# Patient Record
Sex: FEMALE | Race: WHITE | Employment: OTHER | URBAN - METROPOLITAN AREA
[De-identification: names, ages, dates, MRNs, and addresses within clinical notes are randomized per-mention and may not be internally consistent; named-entity substitution may affect disease eponyms.]

---

## 2017-01-31 ENCOUNTER — OFFICE VISIT (OUTPATIENT)
Dept: INTERNAL MEDICINE CLINIC | Facility: CLINIC | Age: 82
End: 2017-01-31

## 2017-01-31 VITALS
WEIGHT: 165 LBS | SYSTOLIC BLOOD PRESSURE: 110 MMHG | TEMPERATURE: 97 F | BODY MASS INDEX: 29.23 KG/M2 | HEART RATE: 64 BPM | HEIGHT: 63 IN | DIASTOLIC BLOOD PRESSURE: 80 MMHG

## 2017-01-31 DIAGNOSIS — I10 ESSENTIAL HYPERTENSION, BENIGN: Primary | ICD-10-CM

## 2017-01-31 DIAGNOSIS — E78.00 PURE HYPERCHOLESTEROLEMIA: ICD-10-CM

## 2017-01-31 LAB
ALBUMIN SERPL BCP-MCNC: 3.4 G/DL (ref 3.5–4.8)
ALBUMIN/GLOB SERPL: 1.1 {RATIO} (ref 1–2)
ALP SERPL-CCNC: 45 U/L (ref 32–100)
ALT SERPL-CCNC: 18 U/L (ref 14–54)
ANION GAP SERPL CALC-SCNC: 7 MMOL/L (ref 0–18)
AST SERPL-CCNC: 16 U/L (ref 15–41)
BASOPHILS # BLD: 0 K/UL (ref 0–0.2)
BASOPHILS NFR BLD: 0 %
BILIRUB SERPL-MCNC: 0.6 MG/DL (ref 0.3–1.2)
BUN SERPL-MCNC: 26 MG/DL (ref 8–20)
BUN/CREAT SERPL: 21 (ref 10–20)
CALCIUM SERPL-MCNC: 9.4 MG/DL (ref 8.5–10.5)
CHLORIDE SERPL-SCNC: 103 MMOL/L (ref 95–110)
CHOLEST SERPL-MCNC: 195 MG/DL (ref 110–200)
CO2 SERPL-SCNC: 25 MMOL/L (ref 22–32)
CREAT SERPL-MCNC: 1.24 MG/DL (ref 0.5–1.5)
EOSINOPHIL # BLD: 0.1 K/UL (ref 0–0.7)
EOSINOPHIL NFR BLD: 2 %
ERYTHROCYTE [DISTWIDTH] IN BLOOD BY AUTOMATED COUNT: 14 % (ref 11–15)
GLOBULIN PLAS-MCNC: 3 G/DL (ref 2.5–3.7)
GLUCOSE SERPL-MCNC: 97 MG/DL (ref 70–99)
HCT VFR BLD AUTO: 36 % (ref 35–48)
HDLC SERPL-MCNC: 43 MG/DL
HGB BLD-MCNC: 12.1 G/DL (ref 12–16)
LDLC SERPL CALC-MCNC: 132 MG/DL (ref 0–99)
LYMPHOCYTES # BLD: 1.1 K/UL (ref 1–4)
LYMPHOCYTES NFR BLD: 24 %
MCH RBC QN AUTO: 31.1 PG (ref 27–32)
MCHC RBC AUTO-ENTMCNC: 33.8 G/DL (ref 32–37)
MCV RBC AUTO: 92.1 FL (ref 80–100)
MONOCYTES # BLD: 0.4 K/UL (ref 0–1)
MONOCYTES NFR BLD: 10 %
NEUTROPHILS # BLD AUTO: 2.9 K/UL (ref 1.8–7.7)
NEUTROPHILS NFR BLD: 64 %
NONHDLC SERPL-MCNC: 152 MG/DL
OSMOLALITY UR CALC.SUM OF ELEC: 285 MOSM/KG (ref 275–295)
PLATELET # BLD AUTO: 227 K/UL (ref 140–400)
PMV BLD AUTO: 9.3 FL (ref 7.4–10.3)
POTASSIUM SERPL-SCNC: 4.5 MMOL/L (ref 3.3–5.1)
PROT SERPL-MCNC: 6.4 G/DL (ref 5.9–8.4)
RBC # BLD AUTO: 3.9 M/UL (ref 3.7–5.4)
SODIUM SERPL-SCNC: 135 MMOL/L (ref 136–144)
TRIGL SERPL-MCNC: 98 MG/DL (ref 1–149)
WBC # BLD AUTO: 4.5 K/UL (ref 4–11)

## 2017-01-31 PROCEDURE — 36415 COLL VENOUS BLD VENIPUNCTURE: CPT | Performed by: INTERNAL MEDICINE

## 2017-01-31 PROCEDURE — 99214 OFFICE O/P EST MOD 30 MIN: CPT | Performed by: INTERNAL MEDICINE

## 2017-01-31 NOTE — PROGRESS NOTES
HPI:    Patient ID: Unruly Gray is a 80year old female. Hypertension  This is a chronic problem. The current episode started more than 1 year ago. The problem is unchanged. The problem is controlled.  Pertinent negatives include no anxiety, blurred v abdominal distention. Endocrine: Negative for cold intolerance and heat intolerance. Genitourinary: Negative for dysuria, urgency, frequency, hematuria, flank pain, difficulty urinating and genital sores.    Musculoskeletal: Positive for arthralgias and Wheelchair with two family members help. HENT:   Mouth/Throat: Oropharynx is clear and moist.   Eyes: EOM are normal. Pupils are equal, round, and reactive to light. Neck: Normal range of motion. No thyromegaly present.    Cardiovascular: Normal rate

## 2017-01-31 NOTE — ASSESSMENT & PLAN NOTE
Patient not interested in going for a CT scan and neurosurgery appointment, difficult to get out of the house.

## 2017-02-16 ENCOUNTER — TELEPHONE (OUTPATIENT)
Dept: INTERNAL MEDICINE CLINIC | Facility: CLINIC | Age: 82
End: 2017-02-16

## 2017-02-16 RX ORDER — LEVOFLOXACIN 250 MG/1
250 TABLET ORAL DAILY
Qty: 5 TABLET | Refills: 0 | Status: SHIPPED | OUTPATIENT
Start: 2017-02-16

## 2017-02-16 NOTE — TELEPHONE ENCOUNTER
Shakira Loyd--daughter  Re: Zan Winston    Has a UTI, frequent urination, has a fowl odor, would like antibiotics sent to pharmacy.     [facility]

## 2017-02-25 RX ORDER — SIMVASTATIN 40 MG
TABLET ORAL
Qty: 30 TABLET | Refills: 11 | Status: SHIPPED | OUTPATIENT
Start: 2017-02-25

## 2017-05-08 RX ORDER — METOPROLOL SUCCINATE 100 MG/1
TABLET, EXTENDED RELEASE ORAL
Qty: 30 TABLET | Refills: 0 | Status: SHIPPED | OUTPATIENT
Start: 2017-05-08 | End: 2017-06-10

## 2017-06-01 RX ORDER — LISINOPRIL 10 MG/1
TABLET ORAL
Qty: 30 TABLET | Refills: 11 | Status: SHIPPED | OUTPATIENT
Start: 2017-06-01 | End: 2018-06-16

## 2017-06-12 RX ORDER — METOPROLOL SUCCINATE 100 MG/1
TABLET, EXTENDED RELEASE ORAL
Qty: 30 TABLET | Refills: 11 | Status: SHIPPED | OUTPATIENT
Start: 2017-06-12 | End: 2018-05-24

## 2017-07-11 ENCOUNTER — APPOINTMENT (OUTPATIENT)
Dept: CT IMAGING | Facility: HOSPITAL | Age: 82
End: 2017-07-11
Attending: EMERGENCY MEDICINE
Payer: MEDICARE

## 2017-07-11 ENCOUNTER — HOSPITAL ENCOUNTER (EMERGENCY)
Facility: HOSPITAL | Age: 82
Discharge: HOME | End: 2017-07-11
Attending: EMERGENCY MEDICINE
Payer: MEDICARE

## 2017-07-11 VITALS
SYSTOLIC BLOOD PRESSURE: 123 MMHG | WEIGHT: 175 LBS | BODY MASS INDEX: 31 KG/M2 | OXYGEN SATURATION: 96 % | RESPIRATION RATE: 18 BRPM | DIASTOLIC BLOOD PRESSURE: 55 MMHG | HEART RATE: 77 BPM | TEMPERATURE: 98 F

## 2017-07-11 DIAGNOSIS — S00.03XA SCALP CONTUSION: Primary | ICD-10-CM

## 2017-07-11 PROCEDURE — 99284 EMERGENCY DEPT VISIT MOD MDM: CPT

## 2017-07-11 PROCEDURE — 72125 CT NECK SPINE W/O DYE: CPT | Performed by: EMERGENCY MEDICINE

## 2017-07-11 PROCEDURE — 70450 CT HEAD/BRAIN W/O DYE: CPT | Performed by: EMERGENCY MEDICINE

## 2017-07-12 NOTE — ED PROVIDER NOTES
Patient Seen in: [facility] Emergency Department    History   Patient presents with:  Head Injury  Fall    Stated Complaint:     HPI    The patient is a 60-year-old female with past history of hypertension, hyperlipidemia, venous stasis ulcers who eye.   Multiple Vitamins-Minerals (OCUVITE ADULT 50+) Oral Cap,  Take  by mouth. Pimecrolimus (ELIDEL) 1 % Apply Externally Cream,  Apply  topically.    Timolol Maleate (TIMOPTIC-XR) 0.5 % Ophthalmic Gel Forming Solution,     silver sulfADIAZINE (MICH warmth to the touch      ED Course   Labs Reviewed - No data to display    ============================================================  ED Course  ------------------------------------------------------------  MDM     Differential diagnosis includes scalp

## 2017-07-12 NOTE — ED INITIAL ASSESSMENT (HPI)
Pt reports she tripped and fell onto ceramic tile. Pt found by caregiver. Pt unsure how long she was on the ground. Per family pts son left around noon and found by caregiver at 200.  Pt reports she doesn't believe she was on the ground for long reporting

## 2017-07-12 NOTE — ED NOTES
Pt continues to deny complaints, pt and family given d/c and f/u instructions. Pt verbalized understanding. Pt d/c home with family via TERESA Montague.

## 2017-07-21 ENCOUNTER — TELEPHONE (OUTPATIENT)
Dept: INTERNAL MEDICINE CLINIC | Facility: CLINIC | Age: 82
End: 2017-07-21

## 2017-07-21 NOTE — TELEPHONE ENCOUNTER
Cristal [facility]----[phone]    Re: Jaskaran Chris a few weeks ago, was taken to [facility], she is very concerned that she can hardly walk, had a CT Scan, also wants to know if she should follow up with a neurologist ???

## 2017-10-16 RX ORDER — TRIAMTERENE AND HYDROCHLOROTHIAZIDE 37.5; 25 MG/1; MG/1
TABLET ORAL
Qty: 90 TABLET | Refills: 0 | Status: SHIPPED | OUTPATIENT
Start: 2017-10-16 | End: 2018-01-20

## 2017-11-27 ENCOUNTER — TELEPHONE (OUTPATIENT)
Dept: INTERNAL MEDICINE CLINIC | Facility: CLINIC | Age: 82
End: 2017-11-27

## 2017-11-27 NOTE — TELEPHONE ENCOUNTER
Cyndi Hein    RE: Ashley     Right Knee has been swollen for awhile now, doesn't know if their is fluid,or  Possibly broken, daughter wants to know if she should take her to the  Emergency room ? ?

## 2017-11-28 ENCOUNTER — APPOINTMENT (OUTPATIENT)
Dept: GENERAL RADIOLOGY | Facility: HOSPITAL | Age: 82
End: 2017-11-28
Attending: EMERGENCY MEDICINE
Payer: MEDICARE

## 2017-11-28 ENCOUNTER — HOSPITAL ENCOUNTER (EMERGENCY)
Facility: HOSPITAL | Age: 82
Discharge: HOME | End: 2017-11-28
Attending: EMERGENCY MEDICINE
Payer: MEDICARE

## 2017-11-28 VITALS
RESPIRATION RATE: 16 BRPM | SYSTOLIC BLOOD PRESSURE: 150 MMHG | BODY MASS INDEX: 29.88 KG/M2 | OXYGEN SATURATION: 98 % | HEIGHT: 64 IN | HEART RATE: 60 BPM | TEMPERATURE: 98 F | WEIGHT: 175 LBS | DIASTOLIC BLOOD PRESSURE: 60 MMHG

## 2017-11-28 DIAGNOSIS — S82.891A CLOSED FRACTURE OF RIGHT ANKLE, INITIAL ENCOUNTER: Primary | ICD-10-CM

## 2017-11-28 PROCEDURE — 73560 X-RAY EXAM OF KNEE 1 OR 2: CPT | Performed by: EMERGENCY MEDICINE

## 2017-11-28 PROCEDURE — 99284 EMERGENCY DEPT VISIT MOD MDM: CPT

## 2017-11-28 PROCEDURE — 73610 X-RAY EXAM OF ANKLE: CPT | Performed by: EMERGENCY MEDICINE

## 2017-11-28 NOTE — CM/SW NOTE
Met with patient, son, and daughter for dcp questions. Patient is to be non wt bearing and to f/u with orthopedic however, per son he will make appt with ortho much closer than given here. Daughter and son see their mother daily and do the cooking.   Estela

## 2017-11-28 NOTE — H&P
[facility]    PATIENT'S NAME: Issac Alberto   ATTENDING PHYSICIAN: Etelvina Leon.  Kody Huynh MD   PATIENT ACCOUNT#:   [id number]    LOCATION:  [address]. [facility]  MEDICAL RECORD #:   [id number]       YOB: 1926  ADMISSION DATE:       11/28/

## 2017-11-28 NOTE — ED PROVIDER NOTES
Patient Seen in: [facility] Emergency Department    History   No chief complaint on file.     Stated Complaint: R Leg Pain, Swelling    HPI     HPI: Maria Luz Sher is a 80year old female who presents after an injury to the right ankle/foot 5 days Vitamins-Minerals ([address] 50+) Oral Cap,  Take  by mouth. Pimecrolimus (ELIDEL) 1 % Apply Externally Cream,  Apply  topically.    Timolol Maleate (TIMOPTIC-XR) 0.5 % Ophthalmic Gel Forming Solution,     silver sulfADIAZINE (SILVADENE) 1 % Apply Ext or erythema  NEURO:Sensation to touch is intact. Motor function intact -limited due to pain  SKIN: No open wounds, no rashes. PSYCH: Normal affect. Calm and cooperative.     ED Course   Labs Reviewed - No data to display    Medications - No data to display compartment. 2. No acute findings. Procedures: Splint Placement: The medical technician placed the patient's right ankle in a Splint Type:  Short leg posterior mold.  The splinted extremity was checked after placement and was no with Specialist as instructed. I personally discussed the results of the above ED workup and the associated acute management issues with the patient and family, and I explained the need for further follow-up evaluation and treatment.     Risk:  Patient is a

## 2017-11-28 NOTE — ED NOTES
Pt t ed from home with c/o twisted rt ankle and fall on Friday last week 5 days ago   Pt skin is ecchymosis and painful to touch   Pt had xray

## 2017-12-14 ENCOUNTER — TELEPHONE (OUTPATIENT)
Dept: INTERNAL MEDICINE CLINIC | Facility: CLINIC | Age: 82
End: 2017-12-14

## 2017-12-14 DIAGNOSIS — S82.891A CLOSED FRACTURE OF RIGHT ANKLE, INITIAL ENCOUNTER: Primary | ICD-10-CM

## 2017-12-14 NOTE — TELEPHONE ENCOUNTER
Abebe----daughter-in-law--  [phone]    Re: Johana Payson    Patient fell and broke ankle,daughter-in-law is asking if it is possible to have [address] come a couple times a week to check her.

## 2017-12-14 NOTE — TELEPHONE ENCOUNTER
I will order home health , but I need to know which ankle. I will order nursing, physical therapy , occupational therapy and .

## 2017-12-15 NOTE — TELEPHONE ENCOUNTER
Zulema Dockery-----son----[phone]    Re: Ashley Arellano    Orders for Home Health, Physical Therapy and       DIAG: BROKEN RIGHT ANKLE

## 2018-01-04 ENCOUNTER — TELEPHONE (OUTPATIENT)
Dept: INTERNAL MEDICINE CLINIC | Facility: CLINIC | Age: 83
End: 2018-01-04

## 2018-01-04 NOTE — TELEPHONE ENCOUNTER
Duane----[facility]------[phone]    Re: Smith Claude    Has a wound on her ankle, needs approval for Vaseline Gauze and Foam Dressing---for 3 times weekly, please call

## 2018-01-05 NOTE — TELEPHONE ENCOUNTER
Spoke with Betty, verbal ok given for Vaseline gauze and foam dressing for 3 times a week, she understood

## 2018-01-09 ENCOUNTER — TELEPHONE (OUTPATIENT)
Dept: INTERNAL MEDICINE CLINIC | Facility: CLINIC | Age: 83
End: 2018-01-09

## 2018-01-09 NOTE — TELEPHONE ENCOUNTER
Kasia from [facility] [place] [address]    Needs a Verbal Order:  Would like to continue four more weeks of Home Health.   Needs Wound Education

## 2018-01-22 RX ORDER — TRIAMTERENE AND HYDROCHLOROTHIAZIDE 37.5; 25 MG/1; MG/1
TABLET ORAL
Qty: 90 TABLET | Refills: 3 | Status: SHIPPED | OUTPATIENT
Start: 2018-01-22 | End: 2019-01-19

## 2018-05-24 RX ORDER — METOPROLOL SUCCINATE 100 MG/1
TABLET, EXTENDED RELEASE ORAL
Qty: 30 TABLET | Refills: 10 | Status: SHIPPED | OUTPATIENT
Start: 2018-05-24

## 2018-06-16 RX ORDER — LISINOPRIL 10 MG/1
TABLET ORAL
Qty: 30 TABLET | Refills: 10 | Status: SHIPPED | OUTPATIENT
Start: 2018-06-16

## 2018-10-11 ENCOUNTER — OFFICE VISIT (OUTPATIENT)
Dept: FAMILY MEDICINE CLINIC | Facility: CLINIC | Age: 83
End: 2018-10-11
Payer: MEDICARE

## 2018-10-11 VITALS
SYSTOLIC BLOOD PRESSURE: 112 MMHG | OXYGEN SATURATION: 98 % | WEIGHT: 175 LBS | BODY MASS INDEX: 30 KG/M2 | DIASTOLIC BLOOD PRESSURE: 67 MMHG | RESPIRATION RATE: 16 BRPM | TEMPERATURE: 98 F | HEART RATE: 69 BPM

## 2018-10-11 DIAGNOSIS — B86 SCABIES: Primary | ICD-10-CM

## 2018-10-11 DIAGNOSIS — B02.9 HERPES ZOSTER WITHOUT COMPLICATION: ICD-10-CM

## 2018-10-11 PROCEDURE — 99203 OFFICE O/P NEW LOW 30 MIN: CPT | Performed by: NURSE PRACTITIONER

## 2018-10-11 RX ORDER — PERMETHRIN 50 MG/G
CREAM TOPICAL
Qty: 60 G | Refills: 0 | Status: SHIPPED | OUTPATIENT
Start: 2018-10-11

## 2018-10-11 RX ORDER — VALACYCLOVIR HYDROCHLORIDE 1 G/1
1 TABLET, FILM COATED ORAL EVERY 8 HOURS
Qty: 21 TABLET | Refills: 0 | Status: SHIPPED | OUTPATIENT
Start: 2018-10-11 | End: 2018-10-18

## 2018-10-11 NOTE — PATIENT INSTRUCTIONS
Shingles  Shingles is a viral infection caused by the same virus that causes chicken pox. Anyone who has had chicken pox may get shingles later in life. The virus stays in the body, but remains asleep (dormant).  Shingles often occurs in older persons or · Trim fingernails and try not to scratch. Scratching the sores may leave scars. · Stay home from work or school until all blisters have formed a crust and you are no longer contagious.   Follow-up care  Follow up with your healthcare provider, or as direc It may take 4 to 6 weeks for symptoms to appear after being exposed. Everyone living in the house with you, as well as your sexual partners, should be treated at the same time. After the first treatment, you will no longer be contagious.  You may return to · For babies or infants, put mittens on their hands. This will stop them from licking the cream or lotion. It will also stop them from scratching themselves because of the itching.   Other medicines  · An oral medicine called ivermectin may be prescribed fo

## 2018-10-11 NOTE — PROGRESS NOTES
CHIEF COMPLAINT:   Patient presents with:  Rash         HPI:3-5    Burak Mcgee is a 80year old female who presents with daughter for evaluation of a rash. Per daughter rash to upper back and shoulder started in the past 3 weeks.  Rash to left leg start Timolol Maleate (TIMOPTIC-XR) 0.5 % Ophthalmic Gel Forming Solution  Disp:  Rfl:    levofloxacin (LEVAQUIN) 250 MG Oral Tab Take 1 tablet (250 mg total) by mouth daily.  Disp: 5 tablet Rfl: 0   Adapalene-Benzoyl Peroxide (EPIDUO) 0.1-2.5 % External Gel Appl SKIN:   1: Maculopapular erythematous lesions and scabs to upper and middle back, posterior shoulder, forearms, and right anterior thigh. Scattered distribution to upper back with linear scratch marks and scabs to right shoulder.   No  burrowing noted to fi Meds & Refills for this Visit:  Requested Prescriptions     Signed Prescriptions Disp Refills   • ValACYclovir HCl 1 G Oral Tab 21 tablet 0     Sig: Take 1 tablet (1,000 mg total) by mouth every 8 (eight) hours for 7 days.    • permethrin 5 % External Cream · In certain cases, antiviral medicines may be prescribed to reduce pain, shorten the illness, and prevent neuralgia. Take these medicines as directed.   · Compresses made from a solution of cool water mixed with cornstarch or baking soda may help relieve p The scabies mite tunnels under the skin. It creates a small burrow, where it leaves its eggs. These eggs sam and grow into adults. They then create new burrows over the next 1 to 2 weeks. The mites die in about 4 to 6 weeks.  The rash and itching are caus · Leave the cream or lotion on for the recommended amount of time. This is usually 8 to 12 hours. · Don’t leave cream or lotion on your skin longer than directed. Don’t use more than recommended. · Clean clothes should be worn after the treatment.   · If · Fever of 100.4°F (38ºC) or higher, or as directed by your provider  Date Last Reviewed: 8/1/2016  © 9497-1534 [employer]. [address] [facility], [address] [place] [address]. All rights reserved.  This information is not intended as a substitute for

## 2018-10-13 ENCOUNTER — TELEPHONE (OUTPATIENT)
Dept: FAMILY MEDICINE CLINIC | Facility: CLINIC | Age: 83
End: 2018-10-13

## 2018-10-13 NOTE — TELEPHONE ENCOUNTER
Daughter Brice Segura called [facility] regarding update form office visit on 10/11/18. States patient's itching and rash is worsening, not getting relief from the cream prescribed. Explained if symptoms are worsening then re-evaluation recommended.  Discussed going t

## 2018-10-19 ENCOUNTER — OFFICE VISIT (OUTPATIENT)
Dept: INTERNAL MEDICINE CLINIC | Facility: CLINIC | Age: 83
End: 2018-10-19
Payer: MEDICARE

## 2018-10-19 ENCOUNTER — LAB ENCOUNTER (OUTPATIENT)
Dept: LAB | Age: 83
End: 2018-10-19
Attending: INTERNAL MEDICINE
Payer: MEDICARE

## 2018-10-19 VITALS
TEMPERATURE: 97 F | SYSTOLIC BLOOD PRESSURE: 155 MMHG | HEIGHT: 63 IN | BODY MASS INDEX: 31 KG/M2 | OXYGEN SATURATION: 98 % | HEART RATE: 57 BPM | DIASTOLIC BLOOD PRESSURE: 81 MMHG

## 2018-10-19 DIAGNOSIS — I10 ESSENTIAL HYPERTENSION, BENIGN: Primary | ICD-10-CM

## 2018-10-19 DIAGNOSIS — E78.00 PURE HYPERCHOLESTEROLEMIA: ICD-10-CM

## 2018-10-19 DIAGNOSIS — I10 ESSENTIAL HYPERTENSION, BENIGN: ICD-10-CM

## 2018-10-19 DIAGNOSIS — L23.9 ALLERGIC DERMATITIS: ICD-10-CM

## 2018-10-19 PROBLEM — I83.001: Status: ACTIVE | Noted: 2018-10-19

## 2018-10-19 PROBLEM — L97.101: Status: ACTIVE | Noted: 2018-10-19

## 2018-10-19 PROCEDURE — 85025 COMPLETE CBC W/AUTO DIFF WBC: CPT

## 2018-10-19 PROCEDURE — 85060 BLOOD SMEAR INTERPRETATION: CPT

## 2018-10-19 PROCEDURE — 84443 ASSAY THYROID STIM HORMONE: CPT

## 2018-10-19 PROCEDURE — G0008 ADMIN INFLUENZA VIRUS VAC: HCPCS | Performed by: INTERNAL MEDICINE

## 2018-10-19 PROCEDURE — 80061 LIPID PANEL: CPT

## 2018-10-19 PROCEDURE — 36415 COLL VENOUS BLD VENIPUNCTURE: CPT

## 2018-10-19 PROCEDURE — 85652 RBC SED RATE AUTOMATED: CPT

## 2018-10-19 PROCEDURE — 90653 IIV ADJUVANT VACCINE IM: CPT | Performed by: INTERNAL MEDICINE

## 2018-10-19 PROCEDURE — 99214 OFFICE O/P EST MOD 30 MIN: CPT | Performed by: INTERNAL MEDICINE

## 2018-10-19 PROCEDURE — G0463 HOSPITAL OUTPT CLINIC VISIT: HCPCS | Performed by: INTERNAL MEDICINE

## 2018-10-19 PROCEDURE — 80053 COMPREHEN METABOLIC PANEL: CPT

## 2018-10-19 RX ORDER — METHYLPREDNISOLONE 4 MG/1
TABLET ORAL
Qty: 1 KIT | Refills: 0 | Status: SHIPPED | OUTPATIENT
Start: 2018-10-19

## 2018-10-19 NOTE — PROGRESS NOTES
HPI:    Patient ID: Magen Rangel is a 80year old female. Rash   This is a new problem. The current episode started 1 to 4 weeks ago. The problem has been gradually worsening since onset. The rash is diffuse.  The rash is characterized by redness and i MOUTH ONE TIME DAILY  Disp: 30 tablet Rfl: 10   METOPROLOL SUCCINATE  MG Oral Tablet 24 Hr TAKE ONE TABLET BY MOUTH ONE TIME DAILY  Disp: 30 tablet Rfl: 10   TRIAMTERENE-HCTZ 37.5-25 MG Oral Tab TAKE ONE TABLET BY MOUTH ONE TIME DAILY  Disp: 90 table tenderness. Neurological: She is alert and oriented to person, place, and time. She exhibits normal muscle tone. Coordination normal.   Skin: Skin is warm. No rash noted. No erythema. No pallor. Psychiatric: She has a normal mood and affect.  Her behavi

## 2018-10-25 ENCOUNTER — TELEPHONE (OUTPATIENT)
Dept: FAMILY MEDICINE CLINIC | Facility: CLINIC | Age: 83
End: 2018-10-25

## 2018-10-25 RX ORDER — LEVOTHYROXINE SODIUM 0.03 MG/1
25 TABLET ORAL
Qty: 30 TABLET | Refills: 11 | Status: SHIPPED | OUTPATIENT
Start: 2018-10-25 | End: 2019-10-10

## 2018-10-25 NOTE — TELEPHONE ENCOUNTER
[place] calling----[phone]    RE: Maximus Gray    Per Pharmacist, patient was suppose to have a thyroid medication sent to pharmacy ? ???

## 2018-10-28 ENCOUNTER — HOSPITAL ENCOUNTER (EMERGENCY)
Facility: HOSPITAL | Age: 83
Discharge: HOME | End: 2018-10-28
Attending: EMERGENCY MEDICINE
Payer: MEDICARE

## 2018-10-28 VITALS
WEIGHT: 170 LBS | HEIGHT: 65 IN | DIASTOLIC BLOOD PRESSURE: 63 MMHG | HEART RATE: 71 BPM | SYSTOLIC BLOOD PRESSURE: 109 MMHG | TEMPERATURE: 98 F | BODY MASS INDEX: 28.32 KG/M2 | RESPIRATION RATE: 18 BRPM | OXYGEN SATURATION: 99 %

## 2018-10-28 DIAGNOSIS — E87.1 HYPONATREMIA: ICD-10-CM

## 2018-10-28 DIAGNOSIS — L30.9 DERMATITIS: Primary | ICD-10-CM

## 2018-10-28 PROCEDURE — 80048 BASIC METABOLIC PNL TOTAL CA: CPT | Performed by: EMERGENCY MEDICINE

## 2018-10-28 PROCEDURE — 96361 HYDRATE IV INFUSION ADD-ON: CPT

## 2018-10-28 PROCEDURE — 99284 EMERGENCY DEPT VISIT MOD MDM: CPT

## 2018-10-28 PROCEDURE — 85025 COMPLETE CBC W/AUTO DIFF WBC: CPT | Performed by: EMERGENCY MEDICINE

## 2018-10-28 PROCEDURE — 96360 HYDRATION IV INFUSION INIT: CPT

## 2018-10-28 RX ORDER — PREDNISONE 20 MG/1
60 TABLET ORAL ONCE
Status: COMPLETED | OUTPATIENT
Start: 2018-10-28 | End: 2018-10-28

## 2018-10-28 RX ORDER — PREDNISONE 20 MG/1
40 TABLET ORAL DAILY
Qty: 12 TABLET | Refills: 0 | Status: SHIPPED | OUTPATIENT
Start: 2018-10-28 | End: 2018-11-03

## 2018-10-28 NOTE — ED NOTES
[facility] Pt resting in bed. Provided pt with additional pillow and blanket for comfort. Family at the bedside. No signs or symptoms of distress. Will continue to monitor and assess.

## 2018-10-28 NOTE — ED PROVIDER NOTES
Patient Seen in: [facility] Emergency Department    History   Patient presents with:  Rash Skin Problem (integumentary)    Stated Complaint: rash    HPI    70-year-old female with past medical history significant for high blood pressure, gastritis noted in HPI. Constitutional and vital signs reviewed. All other systems reviewed and negative except as noted above.     Physical Exam     ED Triage Vitals [10/28/18 0953]   /63   Pulse 62   Resp 16   Temp 97.6 °F (36.4 °C)   Temp src Oral   Sp Narrative: The following orders were created for panel order CBC WITH DIFFERENTIAL WITH PLATELET.   Procedure                               Abnormality         Status                     ---------                               -----------         ------ 0    bacitracin 500 UNIT/GM External Ointment  Apply 1 each topically 2 (two) times daily for 10 days.   Qty: 15 g Refills: 0

## 2018-10-28 NOTE — ED NOTES
IVF has 900 ml left to infuse. Pt bending her arm. Family encouraged to assist and encourage patient to keep arm straight.

## 2018-10-28 NOTE — ED INITIAL ASSESSMENT (HPI)
Per Daughter \"4 weeks ago developed a rash to back, went to clinic 2 weeks ago was treated for scabies and shingles finished the prescription, went to PMD 1 week ago, blood work completed, prescribed 6 days of steriods, rash improved, finished steriods We

## 2018-10-30 ENCOUNTER — TELEPHONE (OUTPATIENT)
Dept: DERMATOLOGY CLINIC | Facility: CLINIC | Age: 83
End: 2018-10-30

## 2018-10-30 ENCOUNTER — TELEPHONE (OUTPATIENT)
Dept: FAMILY MEDICINE CLINIC | Facility: CLINIC | Age: 83
End: 2018-10-30

## 2018-10-30 NOTE — TELEPHONE ENCOUNTER
RN case manager form ED calling regarding this patient. Dr. Cherylene League, ED doc, wanting patient to be seen sooner.  Open appt booked 1130am 10/31/2018

## 2018-10-30 NOTE — TELEPHONE ENCOUNTER
[facility]  Re: Gauri Son    She called Dr Luz Valencia, they couldn't see her until November, they ended up taking her to the ER., they referred her to a dermatologist that would be to far for them. I told her she could try [facility]. Caty Tam

## 2018-10-31 ENCOUNTER — LAB ENCOUNTER (OUTPATIENT)
Dept: LAB | Age: 83
End: 2018-10-31
Attending: DERMATOLOGY
Payer: MEDICARE

## 2018-10-31 ENCOUNTER — OFFICE VISIT (OUTPATIENT)
Dept: DERMATOLOGY CLINIC | Facility: CLINIC | Age: 83
End: 2018-10-31
Payer: MEDICARE

## 2018-10-31 DIAGNOSIS — L12.0 BULLOUS PEMPHIGOID: Primary | ICD-10-CM

## 2018-10-31 DIAGNOSIS — L12.0 BULLOUS PEMPHIGOID: ICD-10-CM

## 2018-10-31 DIAGNOSIS — Z51.81 MEDICATION MONITORING ENCOUNTER: ICD-10-CM

## 2018-10-31 PROCEDURE — 99203 OFFICE O/P NEW LOW 30 MIN: CPT | Performed by: DERMATOLOGY

## 2018-10-31 PROCEDURE — 88346 IMFLUOR 1ST 1ANTB STAIN PX: CPT

## 2018-10-31 PROCEDURE — G0463 HOSPITAL OUTPT CLINIC VISIT: HCPCS | Performed by: DERMATOLOGY

## 2018-10-31 PROCEDURE — 88350 IMFLUOR EA ADDL 1ANTB STN PX: CPT

## 2018-10-31 RX ORDER — PREDNISONE 10 MG/1
TABLET ORAL
Qty: 100 TABLET | Refills: 0 | Status: SHIPPED | OUTPATIENT
Start: 2018-10-31

## 2018-10-31 NOTE — PROGRESS NOTES
Past Medical History:   Diagnosis Date   • Cystocele    • Diverticulosis    • Essential hypertension    • Fibrocystic breast    • Gastritis    • Hyperlipidemia    • Normal pressure hydrocephalus    • Other ill-defined conditions(419.98)     \"Varices\"   •

## 2018-11-08 NOTE — PROGRESS NOTES
Please advise on dose of mycophelolate. I see 250mg and 500 mg. Please order. Pharmacy correct. Given all results and recommendations to daughter, with a verbal understanding. F.u appt made.  Daughter will make f/u with PCP as well

## 2018-11-11 NOTE — PROGRESS NOTES
Kathy Vee is a 80year old female. HPI:     CC:  Patient presents with:  Rash: New pt. Pt presents with itchy rash with blisters throughout body for x 5-6 weeks. Pt went to PCP and was told it was an allergic reaction.  Pt was given Methylprednisolone tablet Rfl: 3   SIMVASTATIN 40 MG Oral Tab TAKE ONE TABLET BY MOUTH EVERY NIGHT AT BEDTIME Disp: 30 tablet Rfl: 11   Adapalene-Benzoyl Peroxide (EPIDUO) 0.1-2.5 % External Gel Apply 1 Units topically nightly.  Disp: 45 g Rfl: 3   aspirin (ASPIR-81) 81 MG Or Not on file    Tobacco Use      Smoking status: Former Smoker        Types: Cigarettes      Smokeless tobacco: Never Used    Substance and Sexual Activity      Alcohol use: No      Drug use: Not on file      Sexual activity: Not on file    Other Topics chills, night sweats, unusual sun sensitivity. No other skin complaints. History, medications, allergies reviewed as noted. Physical Examination:     Well-developed well-nourished patient alert oriented in no acute distress.   Exam performed, testing. Await indirect immunofluorescent results. If confirmatory we will plan on long-term oral steroids and consider steroids. Agent depending on patient's response.   The fact that this is a long-term lifelong disease requiring ongoing management rev

## 2018-11-13 ENCOUNTER — TELEPHONE (OUTPATIENT)
Dept: DERMATOLOGY CLINIC | Facility: CLINIC | Age: 83
End: 2018-11-13

## 2018-11-13 DIAGNOSIS — L12.0 BULLOUS PEMPHIGOID: Primary | ICD-10-CM

## 2018-11-13 DIAGNOSIS — Z51.81 MEDICATION MONITORING ENCOUNTER: ICD-10-CM

## 2018-11-13 RX ORDER — MYCOPHENOLATE MOFETIL 500 MG/1
TABLET ORAL
Qty: 30 TABLET | Refills: 1 | Status: SHIPPED | OUTPATIENT
Start: 2018-11-13 | End: 2019-01-10

## 2018-11-13 NOTE — TELEPHONE ENCOUNTER
Sorry, it looks like Epic kicked me out before I finished this. Mycophenolate 500mg sent qd for now. Pt has appt. . How is she doing?   If she is doing well with no itching or blisters then she may decrease to 30mg( 20am/10pm) every other day Alternating w

## 2018-11-29 NOTE — TELEPHONE ENCOUNTER
Shakira(daughter) called back. Patient has been talking Mycophenolate 500mg daily and 40mg prednisone daily. Per daughter, \"her itching in under control and rash remains but lightening up. \" Gave directions for tapering prednisone with verbal understandi

## 2018-11-30 RX ORDER — PREDNISONE 10 MG/1
TABLET ORAL
Qty: 120 TABLET | Refills: 2 | Status: SHIPPED | OUTPATIENT
Start: 2018-11-30

## 2018-12-01 NOTE — TELEPHONE ENCOUNTER
Yes rf prednisone--sent and yes ok labs at appt. If ok no itching or blisters will most likely decrease the prednisone to 30mg daily at visit. rx sent for 4  Day-but she shuold alt 30 and 40mg as directed previously--just a larger quantity.

## 2018-12-03 ENCOUNTER — TELEPHONE (OUTPATIENT)
Dept: FAMILY MEDICINE CLINIC | Facility: CLINIC | Age: 83
End: 2018-12-03

## 2018-12-03 DIAGNOSIS — G72.0 STEROID-INDUCED MYOPATHY: ICD-10-CM

## 2018-12-03 DIAGNOSIS — T38.0X5A STEROID-INDUCED MYOPATHY: ICD-10-CM

## 2018-12-03 DIAGNOSIS — I10 ESSENTIAL HYPERTENSION, BENIGN: ICD-10-CM

## 2018-12-03 DIAGNOSIS — G91.2 IDIOPATHIC NORMAL PRESSURE HYDROCEPHALUS (INPH) (HCC): Primary | ICD-10-CM

## 2018-12-03 DIAGNOSIS — R53.1 GENERALIZED WEAKNESS: ICD-10-CM

## 2018-12-03 NOTE — TELEPHONE ENCOUNTER
Patient currently not with home heatlh, has had [facility]. States she is quite a bit weaker, not sure if it caused by prednisone for a rash, dermatologist thought she should have blood work. Should she have home health?

## 2018-12-03 NOTE — TELEPHONE ENCOUNTER
Please call daughter and ask if her mother is still active with any home health company , and which one sees/ or has seen her in the past.

## 2018-12-03 NOTE — TELEPHONE ENCOUNTER
Pt's daughter Ismael Lara informed of all below KMT's recc, voiced understanding, she is asking if mycophenylate can cause weakness - states mom has been weak x1 week to the point that she does not want to get up from bed and has been using wheelchair - she d

## 2018-12-03 NOTE — TELEPHONE ENCOUNTER
Asya Farfan    RE: Felix Stevenson    Would like for her mother to have Physical Therapy in house, she said she hardly gets up to walk at all.

## 2018-12-04 NOTE — TELEPHONE ENCOUNTER
She would need home health in order to phave physical therapy at home, and steroids can cuase weakness. Nursing could do labs also.

## 2018-12-06 ENCOUNTER — TELEPHONE (OUTPATIENT)
Dept: FAMILY MEDICINE CLINIC | Facility: CLINIC | Age: 83
End: 2018-12-06

## 2018-12-06 ENCOUNTER — LAB REQUISITION (OUTPATIENT)
Dept: LAB | Facility: HOSPITAL | Age: 83
End: 2018-12-06
Payer: MEDICARE

## 2018-12-06 DIAGNOSIS — G91.2 IDIOPATHIC NORMAL PRESSURE HYDROCEPHALUS (HCC): ICD-10-CM

## 2018-12-06 PROCEDURE — 85025 COMPLETE CBC W/AUTO DIFF WBC: CPT | Performed by: INTERNAL MEDICINE

## 2018-12-06 PROCEDURE — 80053 COMPREHEN METABOLIC PANEL: CPT | Performed by: INTERNAL MEDICINE

## 2018-12-06 NOTE — TELEPHONE ENCOUNTER
[place]--[facility]---[phone]    Re: Gabriel NUNO: Started Home Care Today. Hillary Cain

## 2018-12-07 ENCOUNTER — LAB REQUISITION (OUTPATIENT)
Dept: LAB | Facility: HOSPITAL | Age: 83
End: 2018-12-07
Payer: MEDICARE

## 2018-12-07 DIAGNOSIS — G91.2 IDIOPATHIC NORMAL PRESSURE HYDROCEPHALUS (HCC): ICD-10-CM

## 2018-12-07 PROCEDURE — 81001 URINALYSIS AUTO W/SCOPE: CPT | Performed by: INTERNAL MEDICINE

## 2018-12-13 ENCOUNTER — TELEPHONE (OUTPATIENT)
Dept: DERMATOLOGY CLINIC | Facility: CLINIC | Age: 83
End: 2018-12-13

## 2018-12-13 NOTE — TELEPHONE ENCOUNTER
Pt's daughter states pt is weak and cannot come in for appt tomorrow. [facility] labs done at home per Dr. Stuart Vazquez. Can KMT give Lino Arreguin (daughter) a call regarding next step as patient is too weak to come in.  Please call

## 2018-12-13 NOTE — TELEPHONE ENCOUNTER
Spoke to [employer]. Appt cancelled for tomorrow. LOV 10/1/18. Patient is weak and unable to stand/sit comfortablely. Blood work done 12/6/18 by home health nurse-Dr. Akanksah Aparicio.  Patient continues on prednisone 30mg daily then 40mg daily-alternating days and

## 2018-12-13 NOTE — TELEPHONE ENCOUNTER
Noted. Decrease to 30mg of prednisone for at least this week and will try to taper to 20 and 30mg alternating days after that if no itching, blisters, etc.  Please have them call with update ~ 2 wks-after [place] if ok. Stay on the mycophenolate for now.

## 2018-12-27 ENCOUNTER — TELEPHONE (OUTPATIENT)
Dept: INTERNAL MEDICINE CLINIC | Facility: CLINIC | Age: 83
End: 2018-12-27

## 2018-12-27 NOTE — TELEPHONE ENCOUNTER
Give residential a call and see if pt needs to eval her to get a helena lift, or whatever is best for the patient for transfers, there is also a lift to stand machine. ..

## 2018-12-27 NOTE — TELEPHONE ENCOUNTER
Cristal Pennington called stating her mothers condition is really worsening and are at the point where they need a Sky lift. She doesn't know how to go about getting one, if you just order or not? Please advise.

## 2018-12-27 NOTE — TELEPHONE ENCOUNTER
I spoke with [facility] Bradford, they will have therapist and nurse evaluate the patient, then get back to Dr. Agnieszka Franco with recommendations so that he can place order. Patients daughter Ernesto Casas notified and understands.

## 2019-01-07 NOTE — TELEPHONE ENCOUNTER
[facility], Dr. Gisselle Jeffrey signed order and was faxed back

## 2019-01-10 DIAGNOSIS — Z51.81 MEDICATION MONITORING ENCOUNTER: ICD-10-CM

## 2019-01-10 DIAGNOSIS — L12.0 BULLOUS PEMPHIGOID: ICD-10-CM

## 2019-01-12 RX ORDER — MYCOPHENOLATE MOFETIL 500 MG/1
TABLET ORAL
Qty: 30 TABLET | Refills: 0 | Status: SHIPPED | OUTPATIENT
Start: 2019-01-12 | End: 2019-02-27

## 2019-01-15 NOTE — TELEPHONE ENCOUNTER
LOV was cancelled due to patient illness and weakness. See TE from 12/13/18. Called and spoke with patient's daughter Kyle Davis. She states her mom is the same. Still very weak and tired. No walking or standing. Weakness started after Thanksgiving.  She is w

## 2019-01-16 NOTE — TELEPHONE ENCOUNTER
Spoke with daughter Katina [employer]. Will follow with PCP for symptoms of weakness and fatigue. She is not currently having any skin symptoms. Decrease prednisone to 20 mg daily as per below instructions. Daughter verbalized understanding.   She will report ba

## 2019-01-16 NOTE — TELEPHONE ENCOUNTER
Skin symptoms? Is she having blisters, oral lesions? How is her itching? I don't think the weakness is from her mycophenolate. Continue close monitoring with PCP for this.  If no skin symptoms, then will try to decrease prednisone to 20mg daily x 10d, and

## 2019-01-21 RX ORDER — TRIAMTERENE AND HYDROCHLOROTHIAZIDE 37.5; 25 MG/1; MG/1
TABLET ORAL
Qty: 90 TABLET | Refills: 3 | Status: SHIPPED | OUTPATIENT
Start: 2019-01-21

## 2019-01-31 ENCOUNTER — TELEPHONE (OUTPATIENT)
Dept: FAMILY MEDICINE CLINIC | Facility: CLINIC | Age: 84
End: 2019-01-31

## 2019-01-31 NOTE — TELEPHONE ENCOUNTER
[place]--[facility]---[phone]    Re:Mike Dockery    Was hospitalized again, she now needs verbal orders to resume [address].

## 2019-02-01 NOTE — TELEPHONE ENCOUNTER
Message left for Erin Davidson that Dr. Ronquillo Portal approved verbal orders to restart home health services.

## 2019-02-05 ENCOUNTER — TELEPHONE (OUTPATIENT)
Dept: FAMILY MEDICINE CLINIC | Facility: CLINIC | Age: 84
End: 2019-02-05

## 2019-02-05 NOTE — TELEPHONE ENCOUNTER
Keri---[facility]---[phone]    Looking for a form faxed here for [employer] Gel [employer]lay--stated it was faxed last week, and have not received it.

## 2019-02-05 NOTE — TELEPHONE ENCOUNTER
I called the [address] [place] [address] and they faxed a new form to the office, Dr. Jose F Mckenzie signed and it was faxed back.

## 2019-02-14 ENCOUNTER — TELEPHONE (OUTPATIENT)
Dept: FAMILY MEDICINE CLINIC | Facility: CLINIC | Age: 84
End: 2019-02-14

## 2019-02-14 NOTE — TELEPHONE ENCOUNTER
Kathi/ Wound Care Nurse/ [facility]----[phone]    RE: Celsa Nesbitt      Needs orders, please call.

## 2019-02-27 DIAGNOSIS — L12.0 BULLOUS PEMPHIGOID: ICD-10-CM

## 2019-02-27 DIAGNOSIS — Z51.81 MEDICATION MONITORING ENCOUNTER: ICD-10-CM

## 2019-02-27 RX ORDER — MYCOPHENOLATE MOFETIL 500 MG/1
TABLET ORAL
Qty: 30 TABLET | Refills: 2 | Status: SHIPPED | OUTPATIENT
Start: 2019-02-27 | End: 2019-06-22

## 2019-03-01 NOTE — TELEPHONE ENCOUNTER
Spoke to dtr Aura Crew to confirm RF approved. Dtr states \"rash\" has cleared, pt's skin is stable with pred 20 daily and mycophenolate daily. Pt declining physically, weaker, \"unable to get in car anymore, no core strength. \"  Meds now crushed but pt t

## 2019-03-01 NOTE — TELEPHONE ENCOUNTER
If her BP is stable then may taper to 15 mg ( 1 and 1/2 tab) daily and if ok after 3-4 weeks taper to 10mg qd.  con't mycophenolate

## 2019-03-02 NOTE — TELEPHONE ENCOUNTER
Spoke with pts daughter informed her of [address] recommendations verbalized understanding states that she will monitor her mom and if she sees anything concerning she will give us a call [facility]

## 2019-04-25 ENCOUNTER — TELEPHONE (OUTPATIENT)
Dept: FAMILY MEDICINE CLINIC | Facility: CLINIC | Age: 84
End: 2019-04-25

## 2019-05-02 NOTE — TELEPHONE ENCOUNTER
I called Eugenio Raygoza again, she states the patient had been given Namenda while she was in [facility] back in January by neurology. The pharmacy is having difficulty with getting him to refill this medication.   Eugenio Raygoza recommended that they try sterlingu

## 2019-05-17 RX ORDER — MEMANTINE HYDROCHLORIDE 5 MG/1
5 TABLET ORAL 2 TIMES DAILY
COMMUNITY
End: 2019-05-17

## 2019-05-17 RX ORDER — MEMANTINE HYDROCHLORIDE 5 MG/1
5 TABLET ORAL 2 TIMES DAILY
Qty: 60 TABLET | Refills: 11 | Status: SHIPPED | OUTPATIENT
Start: 2019-05-17 | End: 2020-04-23

## 2019-06-22 DIAGNOSIS — Z51.81 MEDICATION MONITORING ENCOUNTER: ICD-10-CM

## 2019-06-22 DIAGNOSIS — L12.0 BULLOUS PEMPHIGOID: ICD-10-CM

## 2019-06-22 RX ORDER — MYCOPHENOLATE MOFETIL 500 MG/1
TABLET ORAL
Qty: 30 TABLET | Refills: 0 | Status: SHIPPED | OUTPATIENT
Start: 2019-06-22 | End: 2019-08-17

## 2019-06-22 NOTE — TELEPHONE ENCOUNTER
Spoke with Conor Ledezma (pts Daughter) states that she last had labs done in 01/2019 when in [address] [place] [address], she continues to take 10mg Prednisone daily the home health nurse was able to get that refilled, states that it is hard to get her out as she is bed ridden, no blisters or itching would like to continue with the treatment as they are now -[facility]

## 2019-06-22 NOTE — TELEPHONE ENCOUNTER
LOV 10/31/18 pt requesting refill for MYCOPHENOLATE MOFETIL 500 MG Oral Tab no new appt scheduled please advise thank you LIFES[place]E

## 2019-06-22 NOTE — TELEPHONE ENCOUNTER
I would like to try to decrease the prednisone as this will cause long term complications including bone thinning and risk for fractures. The prednisone and the mycophenolate increase the risk for infection. She needs labs ordered/done, it may be possible for home health to draw these. I cannot see the labs from her Jan hospitalization and there is nothing scanned.  I would like to decrease the prednisone to 7.5mg alternating with 10mg for 2 weeks and if stable to decrease to 7.5mg.

## 2019-06-22 NOTE — TELEPHONE ENCOUNTER
Update please--no info since 2/2019. Pt should have labs if possible. Looks like she still has home health. Is she still taking prednisone? Ithcing? Blisters?

## 2019-07-01 NOTE — TELEPHONE ENCOUNTER
Pt's daughter informed of all below KMT's recc - voiced understanding. Pt no longer has home health. No recent labs. She was hospitalized [facility] Jan 2019. They are in the process of arranging palliative care for pt, [facility] and [facility]. Daughter unsure if they'll be willing to draw blood. Pt unable to get in and out of car per daughter.

## 2019-07-04 NOTE — TELEPHONE ENCOUNTER
Noted past updates,pt stable. Ok to continue current regimen for now.  Please let family know and if possible for Palliative [facility] to touch base with our office this would be helpful also

## 2019-07-15 NOTE — TELEPHONE ENCOUNTER
Discussed with pt's daughter -  Pt will start palliative care 7/26/19 and she will have the nurse call us with an update.  She also states that pt is doing fine, with no flare ups of the rash and at this point she will decrease her prednisone to 7.5mg daily (currently she has been on 10mg alternating with 7.5mg as ordered below)

## 2019-08-05 ENCOUNTER — TELEPHONE (OUTPATIENT)
Dept: DERMATOLOGY CLINIC | Facility: CLINIC | Age: 84
End: 2019-08-05

## 2019-08-05 NOTE — TELEPHONE ENCOUNTER
Toshia NP informed of all below instructions - voiced understanding, she will CB in a couple of weeks with further update.

## 2019-08-05 NOTE — TELEPHONE ENCOUNTER
Call received from NP [facility] with an update - pt is doing well, no current rash, no itching, pt currently on prednisone 7.5mg QD, NP stating that at next refill she could probably go down to 5mg QD.  Pt is now a DNR status with syed

## 2019-08-05 NOTE — TELEPHONE ENCOUNTER
Thank you for the update    I would agree with decreasing the prednisone.   I would suggest alternating days with the 7.5mg and the 5mg for 2 weeks then decreasing to 5mg qd if no itching or rash returning

## 2019-08-17 DIAGNOSIS — L12.0 BULLOUS PEMPHIGOID: ICD-10-CM

## 2019-08-17 DIAGNOSIS — Z51.81 MEDICATION MONITORING ENCOUNTER: ICD-10-CM

## 2019-08-20 RX ORDER — MYCOPHENOLATE MOFETIL 500 MG/1
TABLET ORAL
Qty: 30 TABLET | Refills: 1 | Status: SHIPPED | OUTPATIENT
Start: 2019-08-20

## 2019-08-22 NOTE — TELEPHONE ENCOUNTER
Yes- rf sent. Stay on mycophenolate and the 5mg of prednisone. It may be more difficult to taper further due to the dementia.

## 2019-08-22 NOTE — TELEPHONE ENCOUNTER
Spoke to Randall Schreiber NP working with patient. Patient is now taking 5mg prednisone daily with no issues, good dose for patient. Per Randall Schreiber, patient is going to start moving into hospice due to advancing dementia (if insurance will approve).  Randall Schreiber is Tremayne Betancourt

## 2019-10-11 RX ORDER — LEVOTHYROXINE SODIUM 0.03 MG/1
TABLET ORAL
Qty: 30 TABLET | Refills: 10 | Status: SHIPPED | OUTPATIENT
Start: 2019-10-11 | End: 2020-08-20

## 2019-10-11 NOTE — TELEPHONE ENCOUNTER
Please Review;protocol failed.   Requested Prescriptions     Pending Prescriptions Disp Refills   • LEVOTHYROXINE SODIUM 25 MCG Oral Tab [Pharmacy Med Name: Levothyroxine Sodium 25 Mcg Tab Sand] 30 tablet 10     Sig: Take 1 tablet  by mouth before breakfast

## 2020-04-23 RX ORDER — MEMANTINE HYDROCHLORIDE 5 MG/1
TABLET ORAL
Qty: 60 TABLET | Refills: 0 | Status: SHIPPED | OUTPATIENT
Start: 2020-04-23 | End: 2020-05-20

## 2020-05-20 RX ORDER — MEMANTINE HYDROCHLORIDE 5 MG/1
TABLET ORAL
Qty: 60 TABLET | Refills: 5 | Status: SHIPPED | OUTPATIENT
Start: 2020-05-20 | End: 2020-10-22

## 2020-08-20 RX ORDER — LEVOTHYROXINE SODIUM 0.03 MG/1
TABLET ORAL
Qty: 30 TABLET | Refills: 0 | OUTPATIENT
Start: 2020-08-20

## 2020-08-20 RX ORDER — LEVOTHYROXINE SODIUM 0.03 MG/1
25 TABLET ORAL
Qty: 30 TABLET | Refills: 10 | Status: SHIPPED | OUTPATIENT
Start: 2020-08-20 | End: 2021-04-26

## 2020-10-22 RX ORDER — MEMANTINE HYDROCHLORIDE 5 MG/1
5 TABLET ORAL 2 TIMES DAILY
Qty: 60 TABLET | Refills: 5 | Status: SHIPPED | OUTPATIENT
Start: 2020-10-22 | End: 2021-04-09

## 2021-03-03 DIAGNOSIS — Z23 NEED FOR VACCINATION: ICD-10-CM

## 2021-04-09 ENCOUNTER — TELEPHONE (OUTPATIENT)
Dept: INTERNAL MEDICINE CLINIC | Facility: CLINIC | Age: 86
End: 2021-04-09

## 2021-04-09 RX ORDER — MEMANTINE HYDROCHLORIDE 5 MG/1
5 TABLET ORAL 2 TIMES DAILY
Qty: 60 TABLET | Refills: 0 | Status: SHIPPED | OUTPATIENT
Start: 2021-04-09 | End: 2021-04-26

## 2021-04-09 NOTE — TELEPHONE ENCOUNTER
Called patient to schedule phone visit, caregiver stated she cannot talk on phone at this time and hung up.

## 2021-04-26 ENCOUNTER — VIRTUAL PHONE E/M (OUTPATIENT)
Dept: INTERNAL MEDICINE CLINIC | Facility: CLINIC | Age: 86
End: 2021-04-26
Payer: MEDICARE

## 2021-04-26 DIAGNOSIS — G91.2 IDIOPATHIC NORMAL PRESSURE HYDROCEPHALUS (INPH) (HCC): Primary | ICD-10-CM

## 2021-04-26 DIAGNOSIS — E03.9 HYPOTHYROIDISM (ACQUIRED): ICD-10-CM

## 2021-04-26 DIAGNOSIS — I10 ESSENTIAL HYPERTENSION, BENIGN: ICD-10-CM

## 2021-04-26 DIAGNOSIS — F01.50 VASCULAR DEMENTIA WITHOUT BEHAVIORAL DISTURBANCE (HCC): ICD-10-CM

## 2021-04-26 PROCEDURE — 99441 PHONE E/M BY PHYS 5-10 MIN: CPT | Performed by: INTERNAL MEDICINE

## 2021-04-26 RX ORDER — LEVOTHYROXINE SODIUM 0.03 MG/1
25 TABLET ORAL
Qty: 30 TABLET | Refills: 11 | Status: SHIPPED | OUTPATIENT
Start: 2021-04-26

## 2021-04-26 RX ORDER — MEMANTINE HYDROCHLORIDE 5 MG/1
5 TABLET ORAL 2 TIMES DAILY
Qty: 60 TABLET | Refills: 11 | Status: SHIPPED | OUTPATIENT
Start: 2021-04-26

## 2021-04-26 NOTE — PROGRESS NOTES
Virtual Telephone Check-In    Robert Barajas verbally consents to a Virtual/Telephone Check-In visit on 04/26/21. Patient has been referred to the [facility] website at [email or web address] to review the yearly Consent to Treat document.     Patient Kimberley Brett

## 2021-04-26 NOTE — ASSESSMENT & PLAN NOTE
Patient is in bed or recliner, has a 24 hour caregiver , no skin break down, one ensure a day. Comfortable.

## 2021-12-15 NOTE — TELEPHONE ENCOUNTER
[facility] APN visits this pt every 4-6 months. States that she visited pt today and noted both cognitive and physical decline. She consulted with the pt's daughter who was there at the time. Daughter would like to proceed with a hospice evaluation.   Please advi

## 2021-12-15 NOTE — TELEPHONE ENCOUNTER
We are out of the office today , but if you can call and give a verbal order for hospice that would be fine.

## 2021-12-16 ENCOUNTER — TELEPHONE (OUTPATIENT)
Dept: INTERNAL MEDICINE CLINIC | Facility: CLINIC | Age: 86
End: 2021-12-16

## 2021-12-16 NOTE — TELEPHONE ENCOUNTER
Left message to call back    Pend order for review/approval    Order will need to be faxed once approved  [phone]

## 2021-12-16 NOTE — TELEPHONE ENCOUNTER
Daughter, would like to speak directly with the doctor. Daughter, states that the doctor knows what this is about. Pt would like a call back today. No further information was given.

## 2022-01-01 RX ORDER — LEVOTHYROXINE SODIUM 0.03 MG/1
25 TABLET ORAL
Qty: 30 TABLET | Refills: 11 | Status: SHIPPED | OUTPATIENT
Start: 2022-01-01

## 2022-01-01 RX ORDER — MEMANTINE HYDROCHLORIDE 5 MG/1
5 TABLET ORAL 2 TIMES DAILY
Qty: 60 TABLET | Refills: 11 | Status: SHIPPED | OUTPATIENT
Start: 2022-01-01

## 2022-05-02 RX ORDER — MEMANTINE HYDROCHLORIDE 5 MG/1
TABLET ORAL
Qty: 60 TABLET | Refills: 0 | Status: SHIPPED | OUTPATIENT
Start: 2022-05-02

## 2022-10-07 NOTE — TELEPHONE ENCOUNTER
Pt states she got call from office states another medicine was going to call in for her mother. She got call after lab result came back. O-Z Flap Text: The defect edges were debeveled with a #15 scalpel blade.  Given the location of the defect, shape of the defect and the proximity to free margins an O-Z flap was deemed most appropriate.  Using a sterile surgical marker, an appropriate transposition flap was drawn incorporating the defect and placing the expected incisions within the relaxed skin tension lines where possible. The area thus outlined was incised deep to adipose tissue with a #15 scalpel blade.  The skin margins were undermined to an appropriate distance in all directions utilizing iris scissors.

## 2023-03-30 ENCOUNTER — MED REC SCAN ONLY (OUTPATIENT)
Dept: INTERNAL MEDICINE CLINIC | Facility: CLINIC | Age: 88
End: 2023-03-30

## 2024-01-27 NOTE — ED INITIAL ASSESSMENT (HPI)
Pt fell Friday injuring her right knee and ankle. Unable to walk well since fall. Bruising noted to right ankle. Family denies her hitting her head. done

## (undated) NOTE — ED AVS SNAPSHOT
Kathy Vee   MRN: [id number]    Department:  [facility] Emergency Department   Date of Visit:  11/28/2017           Disclosure     Insurance plans vary and the physician(s) referred by the ER may not be covered by your plan.  Please contact within the next three months to obtain basic health screening including reassessment of your blood pressure.     IF THERE IS ANY CHANGE OR WORSENING OF YOUR CONDITION, CALL YOUR PRIMARY CARE PHYSICIAN AT ONCE OR RETURN IMMEDIATELY TO THE EMERGENCY DEPARTMEN

## (undated) NOTE — MR AVS SNAPSHOT
[facility] SPECIALTY [facility] [place] [address] [id number]  [phone]               Thank you for choosing us for your health care visit with Toña De La Vega MD.  We are glad to serve you and happy to provide you with this summary of y OCUVITE ADULT 50+ Caps   Take  by mouth.           silver sulfADIAZINE 1 % Crea   Apply to affected area twice a day   Commonly known as:  SILVADENE           simvastatin 40 MG Tabs   Take 1 tablet by mouth nightly.    What changed:  Another medication wit Support Staff. Remember, MyChart is NOT to be used for urgent needs. For medical emergencies, dial 911.            Visit [facility] online at  [email or web address]

## (undated) NOTE — ED AVS SNAPSHOT
[facility] Emergency Department  [place] [address]   [place] [address]  Phone:  [phone]  Fax:  [address] [facility]   MRN: [id number]    Department:  [facility] Emergency Department   Date of Visit:  7/11/2017 visiting [email or web address]    IF THERE IS ANY CHANGE OR WORSENING OF YOUR CONDITION, CALL YOUR PRIMARY CARE PHYSICIAN AT ONCE OR RETURN IMMEDIATELY TO THE EMERGENCY DEPARTMENT.     If you have been prescribed any medication(s), please fill your prescription

## (undated) NOTE — ED AVS SNAPSHOT
Ivanna Cortes   MRN: [id number]    Department:  [facility] Emergency Department   Date of Visit:  10/28/2018           Disclosure     Insurance plans vary and the physician(s) referred by the ER may not be covered by your plan.  Please contact within the next three months to obtain basic health screening including reassessment of your blood pressure.     IF THERE IS ANY CHANGE OR WORSENING OF YOUR CONDITION, CALL YOUR PRIMARY CARE PHYSICIAN AT ONCE OR RETURN IMMEDIATELY TO THE EMERGENCY DEPARTMEN